# Patient Record
Sex: MALE | Race: OTHER | Employment: FULL TIME | URBAN - METROPOLITAN AREA
[De-identification: names, ages, dates, MRNs, and addresses within clinical notes are randomized per-mention and may not be internally consistent; named-entity substitution may affect disease eponyms.]

---

## 2018-07-22 ENCOUNTER — HOSPITAL ENCOUNTER (EMERGENCY)
Facility: HOSPITAL | Age: 38
Discharge: HOME | End: 2018-07-22
Attending: EMERGENCY MEDICINE
Payer: COMMERCIAL

## 2018-07-22 VITALS
OXYGEN SATURATION: 95 % | RESPIRATION RATE: 16 BRPM | HEIGHT: 67 IN | SYSTOLIC BLOOD PRESSURE: 129 MMHG | TEMPERATURE: 99 F | DIASTOLIC BLOOD PRESSURE: 75 MMHG | BODY MASS INDEX: 29.66 KG/M2 | HEART RATE: 84 BPM | WEIGHT: 189 LBS

## 2018-07-22 DIAGNOSIS — S43.101A ACROMIOCLAVICULAR JOINT SEPARATION, RIGHT, INITIAL ENCOUNTER: Primary | ICD-10-CM

## 2018-07-22 PROCEDURE — 99282 EMERGENCY DEPT VISIT SF MDM: CPT

## 2018-07-22 RX ORDER — IBUPROFEN 600 MG/1
600 TABLET ORAL EVERY 6 HOURS PRN
COMMUNITY

## 2018-07-22 RX ORDER — IBUPROFEN 600 MG/1
600 TABLET ORAL EVERY 8 HOURS PRN
Qty: 30 TABLET | Refills: 0 | Status: SHIPPED | OUTPATIENT
Start: 2018-07-22 | End: 2018-07-29

## 2018-07-22 NOTE — ED INITIAL ASSESSMENT (HPI)
Arrived with R arm secured in sling after injuring R shoulder last night while carrying a heavy box. Examined at [place] [id number] in [facility] yesterday but was not pleased with care.

## 2018-07-22 NOTE — ED PROVIDER NOTES
Patient Seen in: [facility] AND [facility] Emergency Department    History   Patient presents with:  Upper Extremity Injury (musculoskeletal)    Stated Complaint:     HPI    60-year-old male with no significant past medical history presents emergency department deviation present. CV: s1s2+, regular rhythm, normal heart sounds and intact distal pulses. Exam reveals no gallop and no friction rub. No murmur heard. Pulmonary/Chest: Effort normal and breath sounds normal. No respiratory distress.  No wheezes or ra

## 2018-07-22 NOTE — ED NOTES
Pt has own sling. PT safe to DC home per MD. Zully Joyce to dress self. DC teaching done, pt verbalizes understanding. Ambulatory with steady gait to exit.

## 2024-06-08 ENCOUNTER — OFFICE VISIT (OUTPATIENT)
Dept: OTOLARYNGOLOGY | Facility: CLINIC | Age: 44
End: 2024-06-08

## 2024-06-08 VITALS — WEIGHT: 188.81 LBS | BODY MASS INDEX: 30 KG/M2

## 2024-06-08 DIAGNOSIS — G47.30 SLEEP APNEA, UNSPECIFIED TYPE: Primary | ICD-10-CM

## 2024-06-08 DIAGNOSIS — J34.2 DEVIATED NASAL SEPTUM: ICD-10-CM

## 2024-06-08 PROBLEM — S43.101A SEPARATION OF RIGHT ACROMIOCLAVICULAR JOINT, TYPE 3: Status: ACTIVE | Noted: 2018-11-29

## 2024-06-08 RX ORDER — ATORVASTATIN CALCIUM 20 MG/1
20 TABLET, FILM COATED ORAL DAILY
COMMUNITY
Start: 2024-05-14

## 2024-06-08 RX ORDER — FLUTICASONE PROPIONATE 50 MCG
SPRAY, SUSPENSION (ML) NASAL
COMMUNITY
Start: 2024-05-13

## 2024-06-08 RX ORDER — LEVOTHYROXINE SODIUM 0.05 MG/1
50 TABLET ORAL
COMMUNITY
Start: 2024-05-14

## 2024-06-08 NOTE — PROGRESS NOTES
Marietta Maier is a 43 year old male.    Chief Complaint   Patient presents with    Snoring     Patient is here due to snoring, reports worsening x 1 year.     Ear Wax     Ear cleaning        HISTORY OF PRESENT ILLNESS  He presents with worsening snoring in the past year.  He is here with his wife today who states that during the night he literally stops breathing every 5 minutes or so and awakens himself due to his loud snoring.  She has to move them so that helps continue breathing normally.  He does complain of falling asleep throughout the day and sometimes when he is driving actually feel like he wants to go to sleep.  At home when he sits to watch TV he literally will fall asleep almost immediately.  No other significant signs, symptoms or complaints.      Social History     Socioeconomic History    Marital status:    Tobacco Use    Smoking status: Every Day     Types: Cigarettes   Vaping Use    Vaping status: Never Used   Substance and Sexual Activity    Alcohol use: Yes    Drug use: Never       History reviewed. No pertinent family history.    Past Medical History:    Hyperthyroidism       History reviewed. No pertinent surgical history.      REVIEW OF SYSTEMS    System Neg/Pos Details   Constitutional Negative Fatigue, fever and weight loss.   ENMT Negative Drooling.   Eyes Negative Blurred vision and vision changes.   Respiratory Negative Dyspnea and wheezing.   Cardio Negative Chest pain, irregular heartbeat/palpitations and syncope.   GI Negative Abdominal pain and diarrhea.   Endocrine Negative Cold intolerance and heat intolerance.   Neuro Negative Tremors.   Psych Negative Anxiety and depression.   Integumentary Negative Frequent skin infections, pigment change and rash.   Hema/Lymph Negative Easy bleeding and easy bruising.           PHYSICAL EXAM    Wt 188 lb 12.8 oz (85.6 kg)   BMI 29.57 kg/m²        Constitutional Normal Overall appearance - Normal.   Psychiatric Normal Orientation -  Oriented to time, place, person & situation. Appropriate mood and affect.   Neck Exam Normal Inspection - Normal. Palpation - Normal. Parotid gland - Normal. Thyroid gland - Normal.   Eyes Normal Conjunctiva - Right: Normal, Left: Normal. Pupil - Right: Normal, Left: Normal. Fundus - Right: Normal, Left: Normal.   Neurological Normal Memory - Normal. Cranial nerves - Cranial nerves II through XII grossly intact.   Head/Face Normal Facial features - Normal. Eyebrows - Normal. Skull - Normal.        Nasopharynx Normal External nose - Normal. Lips/teeth/gums - Normal. Tonsils - Normal. Oropharynx - Normal.   Ears Normal Inspection - Right: Normal, Left: Normal. Canal - Right: Normal, Left: Normal. TM - Right: Normal, Left: Normal.   Skin Normal Inspection - Normal.        Lymph Detail Normal Submental. Submandibular. Anterior cervical. Posterior cervical. Supraclavicular.        Nose/Mouth/Throat Normal External nose - Normal. Lips/teeth/gums - Normal. Tonsils - Normal. Oropharynx - Normal.   Nose/Mouth/Throat Normal Nares - Right: Normal Left: Normal. Septum -deviated to the left turbinates - Right: Normal, Left: Normal.       Current Outpatient Medications:     atorvastatin 20 MG Oral Tab, Take 1 tablet (20 mg total) by mouth daily., Disp: , Rfl:     fluticasone propionate 50 MCG/ACT Nasal Suspension, SPRAY 2 SPRAYS IN EACH NOSTRIL ONCE A DAY FOR 14 DAYS, Disp: , Rfl:     levothyroxine 50 MCG Oral Tab, Take 1 tablet (50 mcg total) by mouth before breakfast., Disp: , Rfl:     ibuprofen 600 MG Oral Tab, Take 600 mg by mouth every 6 (six) hours as needed for Pain. (Patient not taking: Reported on 6/8/2024), Disp: , Rfl:   ASSESSMENT AND PLAN    1. Sleep apnea, unspecified type  - Home Sleep Apnea Test (Adult pt only) - Sleep consult required for Medicare pts    2. Deviated nasal septum  Constantly falling asleep throughout the day.  Poor sleep throughout the night wakes up multiple times throughout the night due to  heavy snoring and apnea.  Witnessed by his wife.  I did recommend that he a sleep study.  We did discuss that he does have a septal deviation but sounds like he most likely has severe obstructive sleep apnea.        This note was prepared using Dragon [employer] voice recognition dictation software. As a result errors may occur. When identified these errors have been corrected. While every attempt is made to correct errors during dictation discrepancies may still exist    Levar Roberts MD    6/8/2024    1:04 PM

## (undated) NOTE — ED AVS SNAPSHOT
Mike Rivero   MRN: [id number]    Department:  [facility] Emergency Department   Date of Visit:  7/22/2018           Disclosure     Insurance plans vary and the physician(s) referred by the ER may not be covered by your plan.  Please contact CARE PHYSICIAN AT ONCE OR RETURN IMMEDIATELY TO THE EMERGENCY DEPARTMENT. If you have been prescribed any medication(s), please fill your prescription right away and begin taking the medication(s) as directed.   If you believe that any of the medications